# Patient Record
Sex: MALE | Race: BLACK OR AFRICAN AMERICAN | NOT HISPANIC OR LATINO | Employment: FULL TIME | URBAN - METROPOLITAN AREA
[De-identification: names, ages, dates, MRNs, and addresses within clinical notes are randomized per-mention and may not be internally consistent; named-entity substitution may affect disease eponyms.]

---

## 2022-06-26 ENCOUNTER — HOSPITAL ENCOUNTER (EMERGENCY)
Facility: HOSPITAL | Age: 22
Discharge: HOME | End: 2022-06-26
Attending: STUDENT IN AN ORGANIZED HEALTH CARE EDUCATION/TRAINING PROGRAM
Payer: MEDICAID

## 2022-06-26 VITALS
SYSTOLIC BLOOD PRESSURE: 158 MMHG | HEIGHT: 70 IN | BODY MASS INDEX: 23.04 KG/M2 | WEIGHT: 160.94 LBS | HEART RATE: 94 BPM | OXYGEN SATURATION: 99 % | DIASTOLIC BLOOD PRESSURE: 76 MMHG | TEMPERATURE: 98 F | RESPIRATION RATE: 20 BRPM

## 2022-06-26 DIAGNOSIS — M79.641 RIGHT HAND PAIN: Primary | ICD-10-CM

## 2022-06-26 PROCEDURE — 96372 THER/PROPH/DIAG INJ SC/IM: CPT | Performed by: STUDENT IN AN ORGANIZED HEALTH CARE EDUCATION/TRAINING PROGRAM

## 2022-06-26 PROCEDURE — 63600175 PHARM REV CODE 636 W HCPCS: Performed by: STUDENT IN AN ORGANIZED HEALTH CARE EDUCATION/TRAINING PROGRAM

## 2022-06-26 PROCEDURE — 99284 EMERGENCY DEPT VISIT MOD MDM: CPT | Mod: 25

## 2022-06-26 RX ORDER — KETOROLAC TROMETHAMINE 30 MG/ML
15 INJECTION, SOLUTION INTRAMUSCULAR; INTRAVENOUS
Status: COMPLETED | OUTPATIENT
Start: 2022-06-26 | End: 2022-06-26

## 2022-06-26 RX ORDER — DICLOFENAC SODIUM 75 MG/1
75 TABLET, DELAYED RELEASE ORAL 2 TIMES DAILY
Qty: 30 TABLET | Refills: 0 | Status: SHIPPED | OUTPATIENT
Start: 2022-06-26 | End: 2022-08-06

## 2022-06-26 RX ADMIN — KETOROLAC TROMETHAMINE 15 MG: 30 INJECTION, SOLUTION INTRAMUSCULAR at 02:06

## 2022-06-26 NOTE — ED PROVIDER NOTES
Encounter Date: 6/26/2022       History     Chief Complaint   Patient presents with    Hand Pain     Pt c/o R hand pain/swelling after punching a wall yesterday, skin intact     22-year-old male with no significant past medical history presented with right hand pain swelling.  He punched a wall last night.  Since then he has had pain and swelling in his right hand.  He denies any other complaints at this time.  He has not used anything for pain at home.    The history is provided by the patient. No  was used.   Hand Injury   The incident occurred yesterday. The incident occurred at home. The injury mechanism was a direct blow. The pain is present in the right hand. Pertinent negatives include no fever.     Review of patient's allergies indicates:  No Known Allergies  History reviewed. No pertinent past medical history.  History reviewed. No pertinent surgical history.  History reviewed. No pertinent family history.  Social History     Tobacco Use    Smoking status: Current Some Day Smoker   Substance Use Topics    Drug use: Not Currently     Review of Systems   Constitutional: Negative for activity change, chills and fever.   HENT: Negative for congestion and facial swelling.    Eyes: Negative for discharge and redness.   Respiratory: Negative for cough and shortness of breath.    Cardiovascular: Negative for chest pain and leg swelling.   Gastrointestinal: Negative for abdominal distention, abdominal pain, nausea and vomiting.   Genitourinary: Negative for dysuria and hematuria.   Musculoskeletal: Negative for gait problem and neck stiffness.   Skin: Negative for color change and pallor.   Neurological: Negative for dizziness and headaches.       Physical Exam     Initial Vitals [06/26/22 1246]   BP Pulse Resp Temp SpO2   (!) 158/76 94 20 97.9 °F (36.6 °C) 99 %      MAP       --         Physical Exam    Nursing note and vitals reviewed.  Constitutional: He appears well-developed. He is not  diaphoretic. No distress.   HENT:   Head: Normocephalic.   Eyes: Right eye exhibits no discharge. Left eye exhibits no discharge.   Neck: Neck supple.   Normal range of motion.  Cardiovascular: Normal rate, regular rhythm and normal heart sounds. Exam reveals no friction rub.    No murmur heard.  Pulmonary/Chest: Breath sounds normal. No respiratory distress. He has no wheezes. He has no rhonchi. He has no rales.   Abdominal: Abdomen is soft. Bowel sounds are normal. He exhibits no distension. There is no abdominal tenderness. There is no rebound.   Musculoskeletal:         General: No tenderness. Normal range of motion.      Cervical back: Normal range of motion and neck supple.      Comments: Swollen right hand but full RoM, neurovascularly intact distal     Neurological: He is alert and oriented to person, place, and time.   Skin: Skin is warm and dry.         ED Course   Procedures  Labs Reviewed - No data to display       Imaging Results          X-Ray Hand 3 view Right (Final result)  Result time 06/26/22 13:09:33    Final result by Lauri Carlisle MD (06/26/22 13:09:33)                 Impression:      No acute findings.      Electronically signed by: Lauri Carlisle  Date:    06/26/2022  Time:    13:09             Narrative:    EXAMINATION:  XR HAND COMPLETE 3 VIEW RIGHT    CLINICAL HISTORY:  pain from punching wall;    COMPARISON:  None    FINDINGS:  Three views right hand demonstrate no fracture or dislocation.                                 Medications   ketorolac injection 15 mg (has no administration in time range)     Medical Decision Making:   ED Management:  Pt presents to the ED with complaints of right hand pain     Evaluated patient in the emergency department.  He was stable well appearing.  He has full range of motion of his hand.  He was afebrile.  There is no swelling of tan.  There is no concern for infection or gout at this time.  His x-ray is unremarkable for acute fracture.  He is given a  shot of Toradol in the emergency department.  He is instructed to follow up with us in a week the pain has not gotten better as he will on a be unable to see a PCP in that time frame.  He is being discharged home in stable condition at this time.                        Clinical Impression:   Final diagnoses:  [M79.641] Right hand pain (Primary)          ED Disposition Condition    Discharge Stable        ED Prescriptions     Medication Sig Dispense Start Date End Date Auth. Provider    diclofenac (VOLTAREN) 75 MG EC tablet Take 1 tablet (75 mg total) by mouth 2 (two) times daily. 30 tablet 6/26/2022  Onel James MD        Follow-up Information     Follow up With Specialties Details Why Contact Info    [facility] Internal Medicine Call in 1 day  [address] [facility] [address] [phone]           Onel James MD  06/26/22 0430

## 2022-08-06 ENCOUNTER — HOSPITAL ENCOUNTER (EMERGENCY)
Facility: HOSPITAL | Age: 22
Discharge: HOME | End: 2022-08-06
Attending: EMERGENCY MEDICINE
Payer: MEDICAID

## 2022-08-06 VITALS
OXYGEN SATURATION: 100 % | HEIGHT: 70 IN | TEMPERATURE: 98 F | HEART RATE: 79 BPM | RESPIRATION RATE: 16 BRPM | SYSTOLIC BLOOD PRESSURE: 123 MMHG | DIASTOLIC BLOOD PRESSURE: 71 MMHG | BODY MASS INDEX: 22.65 KG/M2 | WEIGHT: 158.19 LBS

## 2022-08-06 DIAGNOSIS — N34.2 URETHRITIS: Primary | ICD-10-CM

## 2022-08-06 LAB
C TRACH DNA SPEC QL NAA+PROBE: NOT DETECTED
N GONORRHOEA DNA SPEC QL NAA+PROBE: NOT DETECTED

## 2022-08-06 PROCEDURE — 87591 N.GONORRHOEAE DNA AMP PROB: CPT | Performed by: EMERGENCY MEDICINE

## 2022-08-06 PROCEDURE — 63600175 PHARM REV CODE 636 W HCPCS: Performed by: EMERGENCY MEDICINE

## 2022-08-06 PROCEDURE — 87491 CHLMYD TRACH DNA AMP PROBE: CPT | Performed by: EMERGENCY MEDICINE

## 2022-08-06 PROCEDURE — 25000003 PHARM REV CODE 250: Performed by: EMERGENCY MEDICINE

## 2022-08-06 PROCEDURE — 99284 EMERGENCY DEPT VISIT MOD MDM: CPT | Mod: 25

## 2022-08-06 PROCEDURE — 96372 THER/PROPH/DIAG INJ SC/IM: CPT | Performed by: EMERGENCY MEDICINE

## 2022-08-06 RX ORDER — DOXYCYCLINE HYCLATE 100 MG
100 TABLET ORAL EVERY 12 HOURS
Status: DISCONTINUED | OUTPATIENT
Start: 2022-08-06 | End: 2022-08-06 | Stop reason: HOSPADM

## 2022-08-06 RX ORDER — LIDOCAINE HYDROCHLORIDE 10 MG/ML
5 INJECTION, SOLUTION EPIDURAL; INFILTRATION; INTRACAUDAL; PERINEURAL
Status: COMPLETED | OUTPATIENT
Start: 2022-08-06 | End: 2022-08-06

## 2022-08-06 RX ORDER — CEFTRIAXONE 500 MG/1
500 INJECTION, POWDER, FOR SOLUTION INTRAMUSCULAR; INTRAVENOUS
Status: COMPLETED | OUTPATIENT
Start: 2022-08-06 | End: 2022-08-06

## 2022-08-06 RX ORDER — DOXYCYCLINE HYCLATE 100 MG
100 TABLET ORAL ONCE
Status: COMPLETED | OUTPATIENT
Start: 2022-08-06 | End: 2022-08-06

## 2022-08-06 RX ADMIN — LIDOCAINE HYDROCHLORIDE 50 MG: 10 INJECTION, SOLUTION EPIDURAL; INFILTRATION; INTRACAUDAL; PERINEURAL at 02:08

## 2022-08-06 RX ADMIN — CEFTRIAXONE SODIUM 500 MG: 500 INJECTION, POWDER, FOR SOLUTION INTRAMUSCULAR; INTRAVENOUS at 02:08

## 2022-08-06 RX ADMIN — DOXYCYCLINE HYCLATE 100 MG: 100 TABLET, COATED ORAL at 02:08

## 2022-08-06 NOTE — ED PROVIDER NOTES
Encounter Date: 8/6/2022       History     Chief Complaint   Patient presents with    Exposure to STD     States possible exposure to STD.  No signs or symptoms.       Exposure to sti; mild urethritis      Male  Problem  Primary symptoms include dysuria.  Primary symptoms include no genital lesions, no penile discharge, no penile pain, no priapism, and no scrotal pain. This is a new problem. The current episode started yesterday. The problem occurs intermittently. The problem has been waxing and waning. The symptoms occur during urination. Pertinent negatives include no anorexia, no diaphoresis, no nausea, no vomiting, no abdominal pain, no abdominal swelling, no frequency, no constipation and no diarrhea. He has tried nothing for the symptoms. Sexual activity: sexually active and multiple partners. He inconsistently uses condoms. Partner displays symptoms of an STD: yes. Associated medical issues do not include gonorrhea, syphilis, chlamydia, herpes simplex, erectile dysfunction, erectile aid use or HIV.     Review of patient's allergies indicates:  No Known Allergies  History reviewed. No pertinent past medical history.  Past Surgical History:   Procedure Laterality Date    TONSILLECTOMY       History reviewed. No pertinent family history.  Social History     Tobacco Use    Smoking status: Heavy Tobacco Smoker     Packs/day: 0.50    Smokeless tobacco: Never Used   Substance Use Topics    Alcohol use: Yes    Drug use: Never     Review of Systems   Constitutional: Negative for diaphoresis.   Gastrointestinal: Negative for abdominal pain, anorexia, constipation, diarrhea, nausea and vomiting.   Genitourinary: Positive for dysuria. Negative for frequency, penile discharge and penile pain.   All other systems reviewed and are negative.      Physical Exam     Initial Vitals [08/06/22 0127]   BP Pulse Resp Temp SpO2   127/83 82 17 98.1 °F (36.7 °C) 98 %      MAP       --         Physical Exam    Nursing note and  vitals reviewed.  Constitutional: He appears well-developed and well-nourished. He is not diaphoretic. No distress.   HENT:   Head: Normocephalic and atraumatic.   Neck: Neck supple. No thyromegaly present. No tracheal deviation present.   Normal range of motion.  Cardiovascular: Normal rate.   Pulmonary/Chest: Breath sounds normal. No respiratory distress. He has no wheezes.   Abdominal: Abdomen is soft. Bowel sounds are normal.   Musculoskeletal:      Cervical back: Normal range of motion and neck supple.     Neurological: He is alert and oriented to person, place, and time. GCS score is 15. GCS eye subscore is 4. GCS verbal subscore is 5. GCS motor subscore is 6.   Skin: Skin is warm and dry. Capillary refill takes less than 2 seconds.   Psychiatric: He has a normal mood and affect. His behavior is normal. Thought content normal.         ED Course   Procedures  Labs Reviewed   CHLAMYDIA/GONORRHOEAE(GC), PCR          Imaging Results    None          Medications   cefTRIAXone injection 500 mg (has no administration in time range)   doxycycline tablet 100 mg (has no administration in time range)   doxycycline tablet 100 mg (has no administration in time range)     Medical Decision Making:   Initial Assessment:   Urethritis; otherwise without symptoms  Differential Diagnosis:   Gonococcal vs non gonoccal urethritis  Clinical Tests:   Lab Tests: Ordered  ED Management:  Plan treat empirically and obtain testing. Patient to follow up results of test in clinic. Home in stable condition with anticipatory guidance, return precautions, fu instructions. Dc in stable condition without event.                      Clinical Impression:   Final diagnoses:  [N34.2] Urethritis (Primary)          ED Disposition Condition    Discharge Stable        ED Prescriptions     None        Follow-up Information    None          Braxton Pack MD  08/06/22 5379

## 2023-09-04 ENCOUNTER — HOSPITAL ENCOUNTER (EMERGENCY)
Facility: HOSPITAL | Age: 23
Discharge: HOME | End: 2023-09-04
Attending: STUDENT IN AN ORGANIZED HEALTH CARE EDUCATION/TRAINING PROGRAM
Payer: MEDICAID

## 2023-09-04 VITALS
HEART RATE: 68 BPM | SYSTOLIC BLOOD PRESSURE: 123 MMHG | OXYGEN SATURATION: 99 % | BODY MASS INDEX: 25.15 KG/M2 | DIASTOLIC BLOOD PRESSURE: 82 MMHG | HEIGHT: 66 IN | TEMPERATURE: 98 F | RESPIRATION RATE: 16 BRPM | WEIGHT: 156.5 LBS

## 2023-09-04 DIAGNOSIS — R04.0 ACUTE ANTERIOR EPISTAXIS: Primary | ICD-10-CM

## 2023-09-04 PROCEDURE — 99281 EMR DPT VST MAYX REQ PHY/QHP: CPT

## 2025-01-29 ENCOUNTER — HOSPITAL ENCOUNTER (EMERGENCY)
Facility: HOSPITAL | Age: 25
Discharge: HOME | End: 2025-01-29
Attending: EMERGENCY MEDICINE

## 2025-01-29 VITALS
BODY MASS INDEX: 23.29 KG/M2 | HEART RATE: 86 BPM | OXYGEN SATURATION: 99 % | DIASTOLIC BLOOD PRESSURE: 85 MMHG | WEIGHT: 162.69 LBS | SYSTOLIC BLOOD PRESSURE: 123 MMHG | TEMPERATURE: 98 F | RESPIRATION RATE: 20 BRPM | HEIGHT: 70 IN

## 2025-01-29 DIAGNOSIS — S63.501A SPRAIN OF RIGHT WRIST, INITIAL ENCOUNTER: Primary | ICD-10-CM

## 2025-01-29 PROCEDURE — 99283 EMERGENCY DEPT VISIT LOW MDM: CPT | Mod: 25

## 2025-01-29 RX ORDER — DICLOFENAC SODIUM 75 MG/1
75 TABLET, DELAYED RELEASE ORAL 2 TIMES DAILY
Qty: 14 TABLET | Refills: 0 | Status: SHIPPED | OUTPATIENT
Start: 2025-01-29 | End: 2025-02-05

## 2025-01-29 NOTE — ED NOTES
RIGHT WRIST PAIN SECONDARY TO FALL WHILE SKATEBOARDING ON YESTERDAY. RATES PAIN 4/10 AT THIS TIME.  SPLINT IN PLACE. (-) GROSS DEFORMITY. VSS. NADN

## 2025-01-30 NOTE — ED PROVIDER NOTES
Encounter Date: 1/29/2025       History     Chief Complaint   Patient presents with    Wrist Injury     Wrist injury x2 this past month.  Last on was yesterday.  Fell on right wrist while skateboarding.       24-year-old male with no past medical history presents to the ER with right wrist pain after falling on an outstretched hand while skateboarding.  Patient states he had the same injury 1 week ago, and then fell again yesterday.  He states that after the 2nd fall the pain got worse.  Patient currently has a wrist brace on that he bought over-the-counter.  No swelling or ecchymosis.  Patient denies any other symptoms including numbness.    The history is provided by the patient.     Review of patient's allergies indicates:  No Known Allergies  History reviewed. No pertinent past medical history.  Past Surgical History:   Procedure Laterality Date    TONSILLECTOMY       No family history on file.  Social History     Tobacco Use    Smoking status: Heavy Smoker     Current packs/day: 0.50     Types: Cigarettes    Smokeless tobacco: Never   Substance Use Topics    Alcohol use: Yes    Drug use: Never     Review of Systems   Constitutional:  Negative for chills and fever.   Eyes:  Negative for visual disturbance.   Respiratory:  Negative for shortness of breath.    Cardiovascular:  Negative for chest pain.   Gastrointestinal:  Negative for nausea and vomiting.   Genitourinary:  Negative for difficulty urinating and dysuria.   Musculoskeletal:  Positive for arthralgias. Negative for joint swelling.   Skin:  Negative for color change and rash.   Neurological:  Negative for weakness and headaches.   Hematological:  Does not bruise/bleed easily.   Psychiatric/Behavioral:  Negative for confusion.    All other systems reviewed and are negative.      Physical Exam     Initial Vitals [01/29/25 1332]   BP Pulse Resp Temp SpO2   123/85 86 20 98.2 °F (36.8 °C) 99 %      MAP       --         Physical Exam    Nursing note and  vitals reviewed.  Constitutional: He appears well-developed and well-nourished.   HENT:   Head: Normocephalic and atraumatic.   Right Ear: External ear normal.   Left Ear: External ear normal.   Nose: Nose normal.   Eyes: Conjunctivae and EOM are normal.   Neck: Neck supple.   Cardiovascular:  Normal rate, regular rhythm and normal heart sounds.     Exam reveals no gallop and no friction rub.       No murmur heard.  Pulmonary/Chest: Breath sounds normal. No respiratory distress. He has no wheezes. He has no rhonchi. He has no rales.   Abdominal: He exhibits no distension.   Musculoskeletal:      Right hand: Tenderness and bony tenderness present. No swelling or deformity. Normal range of motion. Normal strength. Normal sensation. Normal pulse.        Hands:       Cervical back: Neck supple.     Neurological: He is alert and oriented to person, place, and time. GCS eye subscore is 4. GCS verbal subscore is 5. GCS motor subscore is 6.   Skin: Skin is warm and dry. Capillary refill takes less than 2 seconds.         ED Course   Procedures  Labs Reviewed - No data to display       Imaging Results              X-Ray Wrist Complete Right (Final result)  Result time 01/29/25 15:38:34      Final result by Winsome Levy MD (01/29/25 15:38:34)                   Impression:      No acute osseous abnormality.      Electronically signed by: Winsome Levy  Date:    01/29/2025  Time:    15:38               Narrative:    EXAMINATION:  XR WRIST COMPLETE 3 VIEWS RIGHT    CLINICAL HISTORY:  Unspecified fall, initial encounter    TECHNIQUE:  PA, lateral, and oblique views of the right wrist were performed.    COMPARISON:  None.    FINDINGS:  No fracture. No dislocation.    Regional soft tissues are normal.                                       Medications - No data to display  Medical Decision Making  24-year-old male with no past medical history presents to the ER with right wrist pain after falling on an outstretched hand  while skateboarding.  Patient states he had the same injury 1 week ago, and then fell again yesterday.  He states that after the 2nd fall the pain got worse.  Patient currently has a wrist brace on that he bought over-the-counter.  No swelling or ecchymosis.  Patient denies any other symptoms including numbness.    The history is provided by the patient.     Pertinent findings:  X-ray negative.     Clinical diagnosis:  Sprain of right wrist, initial encounter (Primary)    Patient stable for DC with ED Prescriptions     Medication Sig Dispense Start Date End Date Auth. Provider    diclofenac (VOLTAREN) 75 MG EC tablet Take 1 tablet (75 mg total) by   mouth 2 (two) times daily. for 7 days 14 tablet 1/29/2025 2/5/2025 Jennifer Benoit PA         Referrals: f/u with PCP    Strict follow-up precautions given. Patient verbalizes understanding of treatment plan and ED return precautions.       Amount and/or Complexity of Data Reviewed  Radiology: ordered. Decision-making details documented in ED Course.    Risk  Prescription drug management.  Risk Details: Given strict ED return precautions. I have spoken with the patient and/or caregivers. I have explained the patient's condition, diagnoses and treatment plan based on the information available to me at this time. I have answered the patient's and/or caregiver's questions and addressed any concerns. The patient and/or caregivers have as good an understanding of the patient's diagnosis, condition and treatment plan as can be expected at this point. The vital signs have been stable. The patient's condition is stable and appropriate for discharge from the emergency department.      The patient will pursue further outpatient evaluation with the primary care physician or other designated or consulting physician as outlined in the discharge instructions. The patient and/or caregivers are agreeable to this plan of care and follow-up instructions have been explained in detail.  The patient and/or caregivers have received these instructions in written format and have expressed an understanding of the discharge instructions. The patient and/or caregivers are aware that any significant change in condition or worsening of symptoms should prompt an immediate return to this or the closest emergency department or a call to 91               Additional MDM:   Differential Diagnosis:   Other: The following diagnoses were also considered and will be evaluated: contusion, fracture and sprain.            ED Course as of 01/29/25 1908   Wed Jan 29, 2025   1541 X-Ray Wrist Complete Right  No acute osseous abnormality.   [AG]      ED Course User Index  [AG] Jennifer Benoit PA                           Clinical Impression:  Final diagnoses:  [S63.501A] Sprain of right wrist, initial encounter (Primary)          ED Disposition Condition    Discharge Stable          ED Prescriptions       Medication Sig Dispense Start Date End Date Auth. Provider    diclofenac (VOLTAREN) 75 MG EC tablet Take 1 tablet (75 mg total) by mouth 2 (two) times daily. for 7 days 14 tablet 1/29/2025 2/5/2025 Jennifer Benoit PA          Follow-up Information       Follow up With Specialties Details Why Contact Info Additional Information    [facility] Emergency Medicine Go to  If symptoms worsen, As needed [address] [facility] [address] [phone]     [facility] Internal Medicine Call in 3 days  [address] [facility] [address] [phone] Internal Medicine Clinic Entrance #1             Jennifer Benoit PA  01/29/25 1901